# Patient Record
Sex: FEMALE | Race: WHITE | Employment: STUDENT | URBAN - METROPOLITAN AREA
[De-identification: names, ages, dates, MRNs, and addresses within clinical notes are randomized per-mention and may not be internally consistent; named-entity substitution may affect disease eponyms.]

---
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
## 2024-04-26 ENCOUNTER — TELEPHONE (OUTPATIENT)
Facility: CLINIC | Age: 19
End: 2024-04-26

## 2024-07-03 ENCOUNTER — OFFICE VISIT (OUTPATIENT)
Facility: CLINIC | Age: 19
End: 2024-07-03
Payer: COMMERCIAL

## 2024-07-03 VITALS
DIASTOLIC BLOOD PRESSURE: 70 MMHG | WEIGHT: 155 LBS | HEART RATE: 96 BPM | BODY MASS INDEX: 27.46 KG/M2 | SYSTOLIC BLOOD PRESSURE: 112 MMHG | HEIGHT: 63 IN

## 2024-07-03 DIAGNOSIS — Z01.419 ENCOUNTER FOR WELL WOMAN EXAM WITH ROUTINE GYNECOLOGICAL EXAM: Primary | ICD-10-CM

## 2024-07-03 DIAGNOSIS — N94.6 DYSMENORRHEA: ICD-10-CM

## 2024-07-03 DIAGNOSIS — Z79.3 ON ORAL CONTRACEPTIVE PILLS FOR NON-CONTRACEPTION INDICATION: ICD-10-CM

## 2024-07-03 DIAGNOSIS — N92.1 BREAKTHROUGH BLEEDING: ICD-10-CM

## 2024-07-03 DIAGNOSIS — Z11.3 ROUTINE SCREENING FOR STI (SEXUALLY TRANSMITTED INFECTION): ICD-10-CM

## 2024-07-03 PROCEDURE — 3078F DIAST BP <80 MM HG: CPT

## 2024-07-03 PROCEDURE — 3074F SYST BP LT 130 MM HG: CPT

## 2024-07-03 PROCEDURE — 3008F BODY MASS INDEX DOCD: CPT

## 2024-07-03 PROCEDURE — 99395 PREV VISIT EST AGE 18-39: CPT

## 2024-07-03 PROCEDURE — 87491 CHLMYD TRACH DNA AMP PROBE: CPT

## 2024-07-03 PROCEDURE — 87591 N.GONORRHOEAE DNA AMP PROB: CPT

## 2024-07-03 NOTE — PROGRESS NOTES
Marissa Flores is a 19 year old female  No LMP recorded. (Menstrual status: Continuous Pill).   Chief Complaint   Patient presents with    Wellness Visit    Contraception     Refill of birth control pills.   .    OBSTETRICS HISTORY:  OB History    Para Term  AB Living   0 0 0 0 0 0   SAB IAB Ectopic Multiple Live Births   0 0 0 0 0       GYNE HISTORY:  Periods  absent due to taking pill continuously      History   Sexual Activity    Sexual activity: Yes    Partners: Male    Birth control/ protection: OCP, Condom                 MEDICAL HISTORY:  Past Medical History:    ADHD    Anxiety    Astigmatism    Chronic midline low back pain    Closed nondisplaced fracture of proximal phalanx of left index finger, initial encounter    Depression    Dysmenorrhea    Since menarche, at times unable to move due to severity of cramping    Elevated TSH    TSH normal 10/2021    Fracture    double back fracture from gymnastics    Generalized anxiety disorder    History of pelvic ultrasound    Pelvic US - small free fluid, multifollicular ovaries. On my read, possible adenomyosis posterior uterine wall.     Human papilloma virus (HPV) type 9 vaccine administered    Hypertriglyceridemia    Ibuprofen overdose    Kidney stones    saw urology PA. Renal US 2018 normal.     Lumbar spondylolysis    Major depressive disorder    Overweight (BMI 25.0-29.9)    Pain in joint, ankle and foot    Log Date: 2013     Patellofemoral pain syndrome of both knees    Severe major depression, single episode, without psychotic features (HCC)    Suicidal ideation    Tendon tear    Left elbow    Wears contact lenses    Well woman exam (no gynecological exam)       SURGICAL HISTORY:  History reviewed. No pertinent surgical history.    SOCIAL HISTORY:  Social History     Socioeconomic History    Marital status: Single     Spouse name: Not on file    Number of children: Not on file    Years of education: Not on file    Highest  education level: Not on file   Occupational History    Not on file   Tobacco Use    Smoking status: Never    Smokeless tobacco: Never   Vaping Use    Vaping status: Never Used   Substance and Sexual Activity    Alcohol use: Yes     Comment: 3 x a week    Drug use: No    Sexual activity: Yes     Partners: Male     Birth control/protection: OCP, Condom   Other Topics Concern     Service Not Asked    Blood Transfusions Not Asked    Caffeine Concern Yes     Comment: 1 cup daily    Occupational Exposure Not Asked    Hobby Hazards Not Asked    Sleep Concern Not Asked    Stress Concern Not Asked    Weight Concern Not Asked    Special Diet Not Asked    Back Care Not Asked    Exercise Yes     Comment: 3 x weekly    Bike Helmet Not Asked    Seat Belt Not Asked    Self-Exams Not Asked   Social History Narrative    Not on file     Social Determinants of Health     Financial Resource Strain: Not on file   Food Insecurity: Not on file   Transportation Needs: Not on file   Physical Activity: Not on file   Stress: Not on file   Social Connections: Unknown (3/14/2021)    [facility], [facility]    Social Connections     Conversations with friends/family/neighbors per week: Not on file   Housing Stability: Low Risk  (7/9/2021)    Received from [facility], [facility]    Housing Stability     Mortgage Payment Concerns?: Not on file     Number of Places Lived in the Last Year: Not on file     Unstable Housing?: Not on file       FAMILY HISTORY:  Family History   Problem Relation Age of Onset    No Known Problems Mother         Periods are good.     Heart Disease Father 39        CAD - stent at 39. patient tested & was negative for the lipoprotein a problem.     Heart Surgery Father 39        Stent    Anxiety Sister     Depression Sister     OCD Brother     Breast Cancer Maternal Grandmother 60    Hypertension Maternal Grandmother      Breast Cancer 2nd occurrence Maternal Grandmother 70    Diabetes Maternal Grandfather     Lipids Maternal Grandfather     Hypertension Maternal Grandfather     Other (Lymphedema) Maternal Grandfather     Thyroid disease Neg     Ovarian Cancer Neg     Colon Cancer Neg     Endometriosis Neg     Infertility Neg     Genetic Disease Neg     Birth Defects Neg        MEDICATIONS:    Current Outpatient Medications:     norethindrone 5 MG Oral Tab, Take 0.5 tablets (2.5 mg total) by mouth nightly., Disp: 45 tablet, Rfl: 3    escitalopram 20 MG Oral Tab, Take 1 tablet (20 mg total) by mouth daily., Disp: , Rfl:     Methylphenidate HCl ER, LA, 40 MG Oral Capsule SR 24 Hr, Take 1 capsule (40 mg total) by mouth every morning., Disp: , Rfl:     ALLERGIES:  No Known Allergies      Review of Systems:  Constitutional:  Denies fatigue, night sweats, hot flashes  Eyes:  denies blurred or double vision  Cardiovascular:  denies chest pain or palpitations  Respiratory:  denies shortness of breath  Gastrointestinal:  denies heartburn, abdominal pain, diarrhea or constipation  Genitourinary:  denies dysuria, incontinence, abnormal vaginal discharge, vaginal itching  Musculoskeletal:  denies back pain.  Skin/Breast:  Denies any breast pain, lumps, or discharge.   Neurological:  denies headaches, extremity weakness or numbness.  Psychiatric: denies depression or anxiety.  Endocrine:   denies excessive thirst or urination.  Heme/Lymph:  denies history of anemia, easy bruising or bleeding.      PHYSICAL EXAM:   Constitutional: well developed, well nourished  Head/Face: normocephalic  Neck/Thyroid: thyroid symmetric, no thyromegaly, no nodules, no adenopathy  Lymphatic:no abnormal supraclavicular or axillary adenopathy is noted  Breast: normal without palpable masses, tenderness, asymmetry, nipple discharge, nipple retraction or skin changes  Abdomen:  soft, nontender, nondistended, no masses  Skin/Hair: no unusual rashes or  bruises  Extremities: no edema, no cyanosis  Psychiatric:  Oriented to time, place, person and situation. Appropriate mood and affect    Pelvic Exam:  External Genitalia: normal appearance, hair distribution, and no lesions  Urethral Meatus:  normal in size, location, without lesions and prolapse  Bladder:  No fullness, masses or tenderness  Vagina:  Normal appearance without lesions, no abnormal discharge  Cervix:  Normal without tenderness on motion  Uterus: normal in size, contour, position, mobility, without tenderness  Adnexa: normal without masses or tenderness  Perineum: normal  Anus: no hemorroids     Assessment & Plan:  Diagnoses and all orders for this visit:    Encounter for well woman exam with routine gynecological exam    Dysmenorrhea  -     norethindrone 5 MG Oral Tab; Take 0.5 tablets (2.5 mg total) by mouth nightly.    On oral contraceptive pills for non-contraception indication  -     norethindrone 5 MG Oral Tab; Take 0.5 tablets (2.5 mg total) by mouth nightly.    Breakthrough bleeding  -     norethindrone 5 MG Oral Tab; Take 0.5 tablets (2.5 mg total) by mouth nightly.    Routine screening for STI (sexually transmitted infection)  -     Chlamydia/Gc Amplification; Future

## 2024-07-04 LAB
C TRACH DNA SPEC QL NAA+PROBE: NEGATIVE
N GONORRHOEA DNA SPEC QL NAA+PROBE: NEGATIVE

## 2024-07-29 ENCOUNTER — OFFICE VISIT (OUTPATIENT)
Dept: FAMILY MEDICINE CLINIC | Facility: CLINIC | Age: 19
End: 2024-07-29
Payer: COMMERCIAL

## 2024-07-29 VITALS
WEIGHT: 146 LBS | SYSTOLIC BLOOD PRESSURE: 120 MMHG | BODY MASS INDEX: 27.56 KG/M2 | TEMPERATURE: 98 F | HEIGHT: 61 IN | HEART RATE: 98 BPM | DIASTOLIC BLOOD PRESSURE: 70 MMHG | OXYGEN SATURATION: 96 %

## 2024-07-29 DIAGNOSIS — Z00.00 ROUTINE GENERAL MEDICAL EXAMINATION AT A HEALTH CARE FACILITY: Primary | ICD-10-CM

## 2024-07-29 NOTE — PROGRESS NOTES
HPI:   Marissa Flores is a 19 year old female who presents for a complete physical exam.  Last pap:  N/a  Last mammogram:  n/a   Menses:  Continuous ocps.  Managed by [facility].    Contraception:  ocps    Anxiety/depression:  lexapro 20mg daily  ADHD:  Ritalin ER 40mg daily.   Seeing psych    Kidney stone:  In middle school. None since    Hx of recurrent UTI:  no issues currently.     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     Wt Readings from Last 6 Encounters:   07/29/24 146 lb (66.2 kg)   07/03/24 155 lb (70.3 kg)   07/18/23 161 lb 12.8 oz (73.4 kg) (88%, Z= 1.20)*   06/13/23 161 lb (73 kg) (88%, Z= 1.19)*   08/01/22 153 lb 6.4 oz (69.6 kg) (85%, Z= 1.05)*   04/18/22 145 lb (65.8 kg) (80%, Z= 0.83)*     * Growth percentiles are based on CDC (Girls, 2-20 Years) data.     Body mass index is 27.59 kg/m².     Cholesterol, Total (mg/dL)   Date Value   10/11/2021 165     HDL Cholesterol (mg/dL)   Date Value   10/11/2021 43 (L)     LDL Cholesterol (mg/dL)   Date Value   10/11/2021 102 (H)        Current Outpatient Medications   Medication Sig Dispense Refill    norethindrone 5 MG Oral Tab Take 0.5 tablets (2.5 mg total) by mouth nightly. 45 tablet 3    escitalopram 20 MG Oral Tab Take 1 tablet (20 mg total) by mouth daily.      Methylphenidate HCl ER, LA, 40 MG Oral Capsule SR 24 Hr Take 1 capsule (40 mg total) by mouth every morning.        Patient Active Problem List   Diagnosis    Pain in joint, ankle and foot    Closed nondisplaced fracture of proximal phalanx of left index finger, initial encounter    Acute right-sided low back pain without sciatica    Major depressive disorder    Severe major depression, single episode, without psychotic features (HCC)    Patellofemoral pain syndrome of both knees    Generalized anxiety disorder    Well woman exam (no gynecological exam)    Dysmenorrhea    Breakthrough bleeding    Hypertriglyceridemia     Past Medical History:    ADHD    Anxiety     Astigmatism    Chronic midline low back pain    Closed nondisplaced fracture of proximal phalanx of left index finger, initial encounter    Depression    Dysmenorrhea    Since menarche, at times unable to move due to severity of cramping    Elevated TSH    TSH normal 10/2021    Fracture    double back fracture from gymnastics    Generalized anxiety disorder    History of pelvic ultrasound    Pelvic US - small free fluid, multifollicular ovaries. On my read, possible adenomyosis posterior uterine wall.     Human papilloma virus (HPV) type 9 vaccine administered    Hypertriglyceridemia    Ibuprofen overdose    Kidney stones    saw urology PA. Renal US 9/13/2018 normal.     Lumbar spondylolysis    Major depressive disorder    Overweight (BMI 25.0-29.9)    Pain in joint, ankle and foot    Log Date: 01/07/2013     Patellofemoral pain syndrome of both knees    Severe major depression, single episode, without psychotic features (HCC)    Suicidal ideation    Tendon tear    Left elbow    Wears contact lenses    Well woman exam (no gynecological exam)      No past surgical history on file.   Family History   Problem Relation Age of Onset    No Known Problems Mother         Periods are good.     Heart Disease Father 39        CAD - stent at 39. patient tested & was negative for the lipoprotein a problem.     Heart Surgery Father 39        Stent    Anxiety Sister     Depression Sister     OCD Brother     Breast Cancer Maternal Grandmother 60    Hypertension Maternal Grandmother     Breast Cancer 2nd occurrence Maternal Grandmother 70    Diabetes Maternal Grandfather     Lipids Maternal Grandfather     Hypertension Maternal Grandfather     Other (Lymphedema) Maternal Grandfather     Thyroid disease Neg     Ovarian Cancer Neg     Colon Cancer Neg     Endometriosis Neg     Infertility Neg     Genetic Disease Neg     Birth Defects Neg       Social History:   Social History     Socioeconomic History    Marital status: Single    Tobacco Use    Smoking status: Never    Smokeless tobacco: Never   Vaping Use    Vaping status: Never Used   Substance and Sexual Activity    Alcohol use: Yes     Comment: 3 x a week    Drug use: No    Sexual activity: Yes     Partners: Male     Birth control/protection: OCP, Condom   Other Topics Concern    Caffeine Concern Yes     Comment: 1 cup daily    Exercise Yes     Comment: 3 x weekly     Social Determinants of Health      [facility], [facility]    Social Connections    Received from [facility], [facility]    Housing Stability        REVIEW OF SYSTEMS:   GENERAL: feels well otherwise  LUNGS: denies shortness of breath  CARDIOVASCULAR: denies chest pain  GI: denies abdominal pain,  No constipation or diarrhea  : denies dysuria, vaginal discharge or itching    EXAM:   /70 (BP Location: Right arm, Patient Position: Sitting, Cuff Size: large)   Pulse 98   Temp 98.2 °F (36.8 °C) (Oral)   Ht 5' 1\" (1.549 m)   Wt 146 lb (66.2 kg)   SpO2 96%   BMI 27.59 kg/m²   Body mass index is 27.59 kg/m².   GENERAL: well developed, well nourished,in no apparent distress  HEENT: atraumatic, normocephalic, TMs normal  EYES:PERRLA, EOMI,conjunctiva are clear  NECK: supple,no adenopathy, no thyromegaly  BREAST: /  LUNGS: clear to auscultation  CARDIO: RRR without murmur  GI: good BS's,no masses, HSM or tenderness  :/  EXTREMITIES: no edema    ASSESSMENT AND PLAN:   Marissa Flores is a 19 year old female who presents for a complete physical exam.  Encounter Diagnosis   Name Primary?    Routine general medical examination at a health care facility Yes     Pap:  Age 20  Mammogram:  Age 39.    Labs:  Not indicated  Colonoscopy:  Age 44  Vaccines:  None needed  Recommended low fat diet and regular exercise.    The patient is asked to return for CPX in 1 year.  No orders of the defined types were placed in this  encounter.      Meds & Refills for this Visit:  Requested Prescriptions      No prescriptions requested or ordered in this encounter       Imaging & Consults:  None

## 2025-02-13 DIAGNOSIS — N92.1 BREAKTHROUGH BLEEDING: ICD-10-CM

## 2025-02-13 DIAGNOSIS — N94.6 DYSMENORRHEA: ICD-10-CM

## 2025-02-13 DIAGNOSIS — Z79.3 ON ORAL CONTRACEPTIVE PILLS FOR NON-CONTRACEPTION INDICATION: ICD-10-CM

## 2025-02-13 RX ORDER — NORETHINDRONE 5 MG/1
2.5 TABLET ORAL NIGHTLY
Qty: 45 TABLET | Refills: 1 | OUTPATIENT
Start: 2025-02-13

## 2025-02-13 RX ORDER — NORETHINDRONE 5 MG/1
2.5 TABLET ORAL NIGHTLY
Qty: 45 TABLET | Refills: 1 | Status: SHIPPED | OUTPATIENT
Start: 2025-02-13

## 2025-03-24 ENCOUNTER — TELEPHONE (OUTPATIENT)
Facility: CLINIC | Age: 20
End: 2025-03-24

## 2025-06-25 ENCOUNTER — PATIENT MESSAGE (OUTPATIENT)
Dept: FAMILY MEDICINE CLINIC | Facility: CLINIC | Age: 20
End: 2025-06-25

## 2025-06-26 NOTE — TELEPHONE ENCOUNTER
Spoke with patient and informed her Dr. Pena would like to have her urine tested. Advised patient to go to on campus clinic or urgent/immediate care in her area. Patient verbalized understanding.

## 2025-06-26 NOTE — TELEPHONE ENCOUNTER
Please call pt, she really needs to have a urine sample collected.  Review urgent care options please

## 2025-07-25 ENCOUNTER — OFFICE VISIT (OUTPATIENT)
Dept: FAMILY MEDICINE CLINIC | Facility: CLINIC | Age: 20
End: 2025-07-25
Payer: COMMERCIAL

## 2025-07-25 ENCOUNTER — LAB ENCOUNTER (OUTPATIENT)
Dept: LAB | Age: 20
End: 2025-07-25
Attending: FAMILY MEDICINE
Payer: COMMERCIAL

## 2025-07-25 VITALS
RESPIRATION RATE: 16 BRPM | HEART RATE: 71 BPM | HEIGHT: 61.5 IN | DIASTOLIC BLOOD PRESSURE: 68 MMHG | WEIGHT: 147 LBS | SYSTOLIC BLOOD PRESSURE: 110 MMHG | BODY MASS INDEX: 27.4 KG/M2 | TEMPERATURE: 98 F

## 2025-07-25 DIAGNOSIS — Z00.00 EXAMINATION, ROUTINE, OVER 18 YEARS OF AGE: ICD-10-CM

## 2025-07-25 DIAGNOSIS — Z71.82 EXERCISE COUNSELING: ICD-10-CM

## 2025-07-25 DIAGNOSIS — Z11.1 SCREENING-PULMONARY TB: ICD-10-CM

## 2025-07-25 DIAGNOSIS — Z00.00 EXAMINATION, ROUTINE, OVER 18 YEARS OF AGE: Primary | ICD-10-CM

## 2025-07-25 DIAGNOSIS — Z71.3 ENCOUNTER FOR DIETARY COUNSELING AND SURVEILLANCE: ICD-10-CM

## 2025-07-25 LAB
ALBUMIN SERPL-MCNC: 4.7 G/DL (ref 3.2–4.8)
ALBUMIN/GLOB SERPL: 1.6 {RATIO} (ref 1–2)
ALP LIVER SERPL-CCNC: 63 U/L (ref 52–144)
ALT SERPL-CCNC: 12 U/L (ref 10–49)
ANION GAP SERPL CALC-SCNC: 6 MMOL/L (ref 0–18)
AST SERPL-CCNC: 23 U/L (ref ?–34)
BILIRUB SERPL-MCNC: 0.6 MG/DL (ref 0.3–1.2)
BUN BLD-MCNC: 13 MG/DL (ref 9–23)
CALCIUM BLD-MCNC: 9.4 MG/DL (ref 8.7–10.6)
CHLORIDE SERPL-SCNC: 106 MMOL/L (ref 98–112)
CHOLEST SERPL-MCNC: 139 MG/DL (ref ?–200)
CO2 SERPL-SCNC: 25 MMOL/L (ref 21–32)
CREAT BLD-MCNC: 0.97 MG/DL (ref 0.55–1.02)
EGFRCR SERPLBLD CKD-EPI 2021: 85 ML/MIN/1.73M2 (ref 60–?)
ERYTHROCYTE [DISTWIDTH] IN BLOOD BY AUTOMATED COUNT: 13.2 %
FASTING PATIENT LIPID ANSWER: YES
FASTING STATUS PATIENT QL REPORTED: YES
GLOBULIN PLAS-MCNC: 3 G/DL (ref 2–3.5)
GLUCOSE BLD-MCNC: 88 MG/DL (ref 70–99)
HCT VFR BLD AUTO: 44.8 % (ref 35–48)
HDLC SERPL-MCNC: 63 MG/DL (ref 40–59)
HGB BLD-MCNC: 15 G/DL (ref 12–16)
LDLC SERPL CALC-MCNC: 62 MG/DL (ref ?–100)
MCH RBC QN AUTO: 29.1 PG (ref 26–34)
MCHC RBC AUTO-ENTMCNC: 33.5 G/DL (ref 31–37)
MCV RBC AUTO: 87 FL (ref 80–100)
NONHDLC SERPL-MCNC: 76 MG/DL (ref ?–130)
OSMOLALITY SERPL CALC.SUM OF ELEC: 284 MOSM/KG (ref 275–295)
PLATELET # BLD AUTO: 311 10(3)UL (ref 150–450)
POTASSIUM SERPL-SCNC: 4 MMOL/L (ref 3.5–5.1)
PROT SERPL-MCNC: 7.7 G/DL (ref 5.7–8.2)
RBC # BLD AUTO: 5.15 X10(6)UL (ref 3.8–5.3)
SODIUM SERPL-SCNC: 137 MMOL/L (ref 136–145)
TRIGL SERPL-MCNC: 71 MG/DL (ref 30–149)
TSI SER-ACNC: 2.63 UIU/ML (ref 0.55–4.78)
VLDLC SERPL CALC-MCNC: 11 MG/DL (ref 0–30)
WBC # BLD AUTO: 5.4 X10(3) UL (ref 4–11)

## 2025-07-25 PROCEDURE — 86480 TB TEST CELL IMMUN MEASURE: CPT | Performed by: FAMILY MEDICINE

## 2025-07-25 PROCEDURE — 80050 GENERAL HEALTH PANEL: CPT | Performed by: FAMILY MEDICINE

## 2025-07-25 PROCEDURE — 80061 LIPID PANEL: CPT | Performed by: FAMILY MEDICINE

## 2025-07-25 NOTE — PROGRESS NOTES
HPI:   Marissa Flores is a 20 year old female who presents for a complete physical exam.  Last pap:  sees GYN  Last mammogram:  n/a   Menses:  Continuous ocps.  Managed by Dr. Malagon.    Contraception:  ocps    Anxiety/depression:  lexapro 20mg daily  ADHD:  Ritalin ER 40mg daily.   Seeing psych    Kidney stone:  In middle school. None since    Hx of recurrent UTI:  2-3 in the last year.  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     Wt Readings from Last 6 Encounters:   07/25/25 147 lb (66.7 kg)   07/29/24 146 lb (66.2 kg)   07/03/24 155 lb (70.3 kg)   07/18/23 161 lb 12.8 oz (73.4 kg) (88%, Z= 1.20)*   06/13/23 161 lb (73 kg) (88%, Z= 1.19)*   08/01/22 153 lb 6.4 oz (69.6 kg) (85%, Z= 1.05)*     * Growth percentiles are based on CDC (Girls, 2-20 Years) data.     Body mass index is 27.33 kg/m².     Cholesterol, Total (mg/dL)   Date Value   10/11/2021 165     HDL Cholesterol (mg/dL)   Date Value   10/11/2021 43 (L)     LDL Cholesterol (mg/dL)   Date Value   10/11/2021 102 (H)        Current Outpatient Medications   Medication Sig Dispense Refill    norethindrone 5 MG Oral Tab TAKE 1/2 TABLET(2.5 MG) BY MOUTH EVERY NIGHT 45 tablet 1    escitalopram 20 MG Oral Tab Take 1 tablet (20 mg total) by mouth daily.      Methylphenidate HCl ER, LA, 40 MG Oral Capsule SR 24 Hr Take 1 capsule (40 mg total) by mouth every morning.        Patient Active Problem List   Diagnosis    Pain in joint, ankle and foot    Closed nondisplaced fracture of proximal phalanx of left index finger, initial encounter    Acute right-sided low back pain without sciatica    Major depressive disorder    Severe major depression, single episode, without psychotic features (HCC)    Patellofemoral pain syndrome of both knees    Generalized anxiety disorder    Well woman exam (no gynecological exam)    Dysmenorrhea    Breakthrough bleeding    Hypertriglyceridemia     Past  Please call to inform & review the results with the patient- blood work showed normal kidney function levels and serum IgA 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 This is an immune system level. I recommend seeing immunology for further evaluation and management of this finding.  Continue with previous recommendations. If no improvement in symptoms or symptoms worsen, call/follow-up at clinic or go to ER.  Please release results to patient's mychart once you have discussed results and recommendations with patient.  Thanks,         Medical History:    ADHD    Anxiety    Astigmatism    Chronic midline low back pain    Closed nondisplaced fracture of proximal phalanx of left index finger, initial encounter    Depression    Dysmenorrhea    Since menarche, at times unable to move due to severity of cramping    Elevated TSH    TSH normal 10/2021    Fracture    double back fracture from gymnastics    Generalized anxiety disorder    History of pelvic ultrasound    Pelvic US - small free fluid, multifollicular ovaries. On my read, possible adenomyosis posterior uterine wall.     Human papilloma virus (HPV) type 9 vaccine administered    Hypertriglyceridemia    Ibuprofen overdose    Kidney stones    saw urology PA. Renal US 9/13/2018 normal.     Lumbar spondylolysis    Major depressive disorder    Overweight (BMI 25.0-29.9)    Pain in joint, ankle and foot    Log Date: 01/07/2013     Patellofemoral pain syndrome of both knees    Severe major depression, single episode, without psychotic features (HCC)    Suicidal ideation    Tendon tear    Left elbow    Wears contact lenses    Well woman exam (no gynecological exam)      No past surgical history on file.   Family History   Problem Relation Age of Onset    No Known Problems Mother         Periods are good.     Heart Disease Father 39        CAD - stent at 39. patient tested & was negative for the lipoprotein a problem.     Heart Surgery Father 39        Stent    Anxiety Sister     Depression Sister     OCD Brother     Breast Cancer Maternal Grandmother 60    Hypertension Maternal Grandmother     Breast Cancer 2nd occurrence Maternal Grandmother 70    Diabetes Maternal Grandfather     Lipids Maternal Grandfather     Hypertension Maternal Grandfather     Other (Lymphedema) Maternal Grandfather     Thyroid disease Neg     Ovarian Cancer Neg     Colon Cancer Neg     Endometriosis Neg     Infertility Neg     Genetic Disease Neg     Birth Defects Neg       Social History:   Social History     Socioeconomic  History    Marital status: Single   Tobacco Use    Smoking status: Never    Smokeless tobacco: Never   Vaping Use    Vaping status: Never Used   Substance and Sexual Activity    Alcohol use: Yes     Comment: 1  x week    Drug use: No    Sexual activity: Yes     Partners: Male     Birth control/protection: OCP, Condom   Other Topics Concern    Caffeine Concern Yes     Comment: 1 cup daily    Exercise Yes     Comment: 3-4  x weekly     Social Drivers of Health      Received from [facility]    Housing Stability        REVIEW OF SYSTEMS:   GENERAL: feels well otherwise  LUNGS: denies shortness of breath  CARDIOVASCULAR: denies chest pain  GI: denies abdominal pain,  No constipation or diarrhea  : denies dysuria, vaginal discharge or itching    EXAM:   /68   Pulse 71   Temp 97.6 °F (36.4 °C)   Resp 16   Ht 5' 1.5\" (1.562 m)   Wt 147 lb (66.7 kg)   BMI 27.33 kg/m²   Body mass index is 27.33 kg/m².   GENERAL: well developed, well nourished,in no apparent distress  HEENT: atraumatic, normocephalic, TMs normal  EYES:PERRLA, EOMI,conjunctiva are clear  NECK: supple,no adenopathy, no thyromegaly  BREAST: /  LUNGS: clear to auscultation  CARDIO: RRR without murmur  GI: good BS's,no masses, HSM or tenderness  :/  EXTREMITIES: no edema    ASSESSMENT AND PLAN:   Marissa Flores is a 20 year old female who presents for a complete physical exam.  Encounter Diagnoses   Name Primary?    Examination, routine, over 18 years of age Yes    Exercise counseling     Encounter for dietary counseling and surveillance     Screening-pulmonary TB      Pap:  Age 20  Mammogram:  Age 39.    Labs: ordered  Colonoscopy:  Age 44  Vaccines:  Tdap today  Recommended low fat diet and regular exercise.    The patient is asked to return for CPX in 1 year.  Orders Placed This Encounter   Procedures    Lipid Panel    CBC, Platelet; No Differential    Comp Metabolic Panel (14)    TSH W Reflex To Free T4    Quantiferon TB  Plus    Tdap (Adacel, Boostrix) [[id number]]       Meds & Refills for this Visit:  Requested Prescriptions      No prescriptions requested or ordered in this encounter       Imaging & Consults:  TETANUS, DIPHTHERIA TOXOIDS AND ACELLULAR PERTUSIS VACCINE (TDAP), >7 YEARS, IM USE

## 2025-07-29 LAB
M TB IFN-G CD4+ T-CELLS BLD-ACNC: 0.02 IU/ML
M TB TUBERC IFN-G BLD QL: NEGATIVE
M TB TUBERC IGNF/MITOGEN IGNF CONTROL: >10 IU/ML
QFT TB1 AG MINUS NIL: 0 IU/ML
QFT TB2 AG MINUS NIL: 0 IU/ML

## 2025-07-31 ENCOUNTER — PATIENT MESSAGE (OUTPATIENT)
Dept: FAMILY MEDICINE CLINIC | Facility: CLINIC | Age: 20
End: 2025-07-31